# Patient Record
Sex: MALE | Race: ASIAN
[De-identification: names, ages, dates, MRNs, and addresses within clinical notes are randomized per-mention and may not be internally consistent; named-entity substitution may affect disease eponyms.]

---

## 2020-07-26 ENCOUNTER — HOSPITAL ENCOUNTER (EMERGENCY)
Dept: HOSPITAL 62 - ER | Age: 20
Discharge: HOME | End: 2020-07-26
Payer: OTHER GOVERNMENT

## 2020-07-26 VITALS — DIASTOLIC BLOOD PRESSURE: 53 MMHG | SYSTOLIC BLOOD PRESSURE: 129 MMHG

## 2020-07-26 DIAGNOSIS — R63.0: ICD-10-CM

## 2020-07-26 DIAGNOSIS — R21: Primary | ICD-10-CM

## 2020-07-26 DIAGNOSIS — R42: ICD-10-CM

## 2020-07-26 PROCEDURE — 99282 EMERGENCY DEPT VISIT SF MDM: CPT

## 2020-07-26 NOTE — ER DOCUMENT REPORT
ED Skin Rash/Insect Bite/Abscs





- General


Chief Complaint: Skin Problem


Stated Complaint: SPOTS ON BODY, LIGHTHEADED, DECREASE IN APPETITE


Time Seen by Provider: 07/26/20 20:54


Primary Care Provider: 


FLORENCIO HOLDER DO [ACTIVE STAFF] - Follow up as needed


Mode of Arrival: Ambulatory


Information source: Parent


Notes: 





19-year-old male presented to ED for rash generalized from his neck down.  He 

states he has had this rash since September 2019.  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 

kidney function and he was given IV fluids until just Doppler retraining for a 

little while.  He states he has had multiple tests since then but stated he had 

no problems with his kidney function.  He states while at [employer] he has 

been seen by a dermatologist twice.  He states they have done biopsies of the 

rash but the biopsies came back negative.  He states he has never been treated 

for any infestation of scabies.  The rash does have several areas that are 

straight lines.  He does have them to the hands and feet.  He states it is not 

severely itchy right now it is just there.  He states he has not been able to go

back to the [facility] or dermatologist since the coronavirus because that office is 

closed right now.





- HPI


Patient complains to provider of: Skin rash/lesion


Onset: Other - Since September


Onset/Duration: Persistent


Quality of pain: No pain


Severity: None


Pain Level: Denies


Skin Character: Rash


Quality of rash: Other - He states it does not itch and he does not scratch it


Identify cause: No


Exacerbated by: Denies


Relieved by: Denies


Similar symptoms previously: Yes


Recently seen / treated by doctor: Yes





Past Medical History





- General


Information source: Patient





- Social History


Smoking Status: Never Smoker


Frequency of alcohol use: None


Drug Abuse: None


Lives with: Other - [facility] active [employer]


Family History: Reviewed & Not Pertinent


Patient has suicidal ideation: No


Patient has homicidal ideation: No





- Past Medical History


Cardiac Medical History: Reports: None


Pulmonary Medical History: Reports: None


EENT Medical History: Reports: None


Neurological Medical History: Reports: None


Endocrine Medical History: Reports: None


Renal/ Medical History: Reports: None


Malignancy Medical History: Reports None


GI Medical History: Reports: None


Musculoskeletal Medical History: Reports None


Skin Medical History: Reports None


Psychiatric Medical History: Reports: None


Traumatic Medical History: Reports: None


Infectious Medical History: Reports: None


Surgical Hx: Negative


Past Surgical History: Reports: None





- Immunizations


Immunizations up to date: Yes


Hx Diphtheria, Pertussis, Tetanus Vaccination: Yes





Review of Systems





- Review of Systems


Constitutional: No symptoms reported


EENT: No symptoms reported


Cardiovascular: No symptoms reported


Respiratory: No symptoms reported


Gastrointestinal: No symptoms reported


Genitourinary: No symptoms reported


Male Genitourinary: No symptoms reported


Musculoskeletal: No symptoms reported


Skin: Rash


Hematologic/Lymphatic: No symptoms reported


Neurological/Psychological: No symptoms reported


-: Yes All other systems reviewed and negative





Physical Exam





- Vital signs


Vitals: 


                                        











Temp Pulse Resp BP Pulse Ox


 


 98.3 F   84   18   129/53 H  97 


 


 07/26/20 19:34  07/26/20 19:34  07/26/20 19:34  07/26/20 19:34  07/26/20 19:34











Interpretation: Normal





- General


General appearance: Appears well, Alert





- HEENT


Head: Normocephalic, Atraumatic


Eyes: Normal


Pupils: PERRL





- Respiratory


Respiratory status: No respiratory distress


Chest status: Nontender


Breath sounds: Normal


Chest palpation: Normal





- Cardiovascular


Rhythm: Regular


Heart sounds: Normal auscultation


Murmur: No





- Abdominal


Inspection: Normal


Distension: No distension


Bowel sounds: Normal


Tenderness: Nontender


Organomegaly: No organomegaly





- Back


Back: Normal, Nontender





- Extremities


General upper extremity: Normal inspection, Nontender, Normal color, Normal ROM,

Normal temperature


General lower extremity: Normal inspection, Nontender, Normal color, Normal ROM,

Normal temperature, Normal weight bearing.  No: [place]'s sign





- Neurological


Neuro grossly intact: Yes


Cognition: Normal


Orientation: AAOx4


[place] Coma Scale Eye Opening: Spontaneous


[place] Coma Scale Verbal: Oriented


[place] Coma Scale Motor: Obeys Commands


[place] Coma Scale Total: 15


Speech: Normal


Motor strength normal: LUE, RUE, LLE, RLE


Sensory: Normal





- Psychological


Associated symptoms: Normal affect, Normal mood





- Skin


Skin Temperature: Warm


Skin Moisture: Dry


Skin Color: Normal


Skin irregularity: Rash


Location of irregularity: Generalized


Character of irregularity: Fine, Linear


Irregularity with: negative: Swelling, Tenderness, Warmth





Course





- Vital Signs


Vital signs: 


                                        











Temp Pulse Resp BP Pulse Ox


 


 98.3 F   84   18   129/53 H  97 


 


 07/26/20 19:34  07/26/20 19:34  07/26/20 19:34  07/26/20 19:34  07/26/20 19:34














Discharge





- Discharge


Clinical Impression: 


 Rash and nonspecific skin eruption, Possible scabies





Condition: Stable


Disposition: HOME, SELF-CARE


Additional Instructions: 


You were seen today for a rash that she states been there for a long time.  You 

state you have been to a dermatologist.





Rash looks like a scabies rash.  But she states the dermatologist did not 

diagnose you with scabies.





I am going to order the medication for scabies.  It will help you with the itch 

and if there is any infestation it will help to kill them.  You need to do as I 

instructed you take a shower put the medicine on from your head down leave it on

until morning take a shower change her sheets in the morning and wash all 

clothes you have been wearing before you put the morning.





Scabies





     Your exam suggests the presence of scabies, which are microscopic parasites

of the skin.  These mites burrow through the skin, causing severe itching.  The 

mite can be spread to other persons by skin contact.


     All clothing, towels, and bedding should be washed in very hot water, set 

aside for a week, then washed again.  You should apply scabies-killing lotion 

from the neck down, then wash it off after 12 hours.  You may need medication 

for itching, as the itch persists for many days after the mites have been killed

.


     All family members and close personal contacts should be examined. Repeat 

treatment may be necessary if the infestation is not eliminated with a single 

treatment.


     Call the doctor if you develop increasing swelling and redness, red 

streaks, tender lumps, fever, or drainage from a skin sore.





FOLLOW-UP CARE:


If you have been referred to a physician for follow-up care, call the 

physicians office for an appointment as you were instructed or within the next 

two days.  If you experience worsening or a significant change in your symptoms,

notify the physician immediately or return to the Emergency Department at any 

time for re-evaluation.


Prescriptions: 


Permethrin [Acticin 5% Cream 60 gm] 2 applic TP ASDIR PRN #1 tube


 PRN Reason: 


Referrals: 


FLORENCIO HOLDER,  [ACTIVE STAFF] - Follow up as needed